# Patient Record
Sex: FEMALE | Race: WHITE | NOT HISPANIC OR LATINO | URBAN - NONMETROPOLITAN AREA
[De-identification: names, ages, dates, MRNs, and addresses within clinical notes are randomized per-mention and may not be internally consistent; named-entity substitution may affect disease eponyms.]

---

## 2017-07-25 NOTE — PATIENT DISCUSSION
1 day PO: Patient is doing well post-operatively. The importance of post-op drop compliance was emphasized. Drop schedule reviewed with patient. Patient to call if any visual changes or concerns.

## 2017-08-10 NOTE — PATIENT DISCUSSION
1 week PO: Patient is doing well post-operatively. The importance of post-op drop compliance was emphasized. Drop scheduled reviewed with patient. Patient to call if any visual changes or concerns.

## 2017-11-22 NOTE — PROCEDURE NOTE: SURGICAL
Prior to commencing surgery patient identification, surgical procedure, site, and side were confirmed by Dr. Jamila Coughlin. Following topical proparacaine anesthesia, the patient was positioned at the YAG laser, a contact lens coupled to the cornea with methylcellulose and an axial posterior capsulotomy performed without complication using 2.7 Mj x 34. Excess methylcellulose was washed from the eye, one drop of Alphagan was instilled and the patient returned to the holding area having tolerated the procedure well and without complication. <br />

## 2017-11-29 NOTE — PROCEDURE NOTE: SURGICAL
Prior to commencing surgery patient identification, surgical procedure, site, and side were confirmed by Dr. Caty Merritt. Following topical proparacaine anesthesia, the patient was positioned at the YAG laser, a contact lens coupled to the cornea with methylcellulose and an axial posterior capsulotomy performed without complication using 2.4 Mj x 47. Excess methylcellulose was washed from the eye, one drop of Alphagan was instilled and the patient returned to the holding area having tolerated the procedure well and without complication. <br />

## 2020-01-15 ENCOUNTER — IMPORTED ENCOUNTER (OUTPATIENT)
Dept: URBAN - NONMETROPOLITAN AREA CLINIC 1 | Facility: CLINIC | Age: 60
End: 2020-01-15

## 2020-01-15 PROBLEM — H25.13: Noted: 2020-01-15

## 2020-01-15 PROBLEM — H52.223: Noted: 2020-01-15

## 2020-01-15 PROBLEM — H52.13: Noted: 2020-01-15

## 2020-01-15 PROBLEM — H52.4: Noted: 2020-01-15

## 2020-01-15 PROBLEM — H43.813: Noted: 2020-01-15

## 2020-01-15 PROCEDURE — 92004 COMPRE OPH EXAM NEW PT 1/>: CPT

## 2020-01-15 PROCEDURE — 92015 DETERMINE REFRACTIVE STATE: CPT

## 2020-01-15 NOTE — PATIENT DISCUSSION
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  Patient instructed to call or come in if changes in vision are noted from today -  monitor yearly or prn; 's Notes: MR 1/15/2020DFE 1/15/2020

## 2022-04-09 ASSESSMENT — VISUAL ACUITY
OS_SC: 20/25
OD_CC: 20/30-2
OS_CC: 20/40+

## 2022-04-09 ASSESSMENT — TONOMETRY
OS_IOP_MMHG: 18
OD_IOP_MMHG: 17